# Patient Record
Sex: MALE | Race: WHITE | NOT HISPANIC OR LATINO | URBAN - METROPOLITAN AREA
[De-identification: names, ages, dates, MRNs, and addresses within clinical notes are randomized per-mention and may not be internally consistent; named-entity substitution may affect disease eponyms.]

---

## 2017-03-10 ENCOUNTER — FOLLOW UP ESTABLISHED (OUTPATIENT)
Dept: URBAN - METROPOLITAN AREA CLINIC 51 | Facility: CLINIC | Age: 72
End: 2017-03-10
Payer: MEDICARE

## 2017-03-10 DIAGNOSIS — H02.831 DERMATOCHALASIS OF RIGHT UPPER LID: ICD-10-CM

## 2017-03-10 DIAGNOSIS — H52.4 PRESBYOPIA: ICD-10-CM

## 2017-03-10 DIAGNOSIS — H17.11 CENTRAL CORNEAL OPACITY, RIGHT EYE: ICD-10-CM

## 2017-03-10 PROCEDURE — 92250 FUNDUS PHOTOGRAPHY W/I&R: CPT | Performed by: OPTOMETRIST

## 2017-03-10 PROCEDURE — 92014 COMPRE OPH EXAM EST PT 1/>: CPT | Performed by: OPTOMETRIST

## 2017-03-10 ASSESSMENT — INTRAOCULAR PRESSURE
OS: 16
OD: 16

## 2017-03-10 ASSESSMENT — VISUAL ACUITY
OS: 20/20
OD: 20/50

## 2018-03-20 ENCOUNTER — FOLLOW UP ESTABLISHED (OUTPATIENT)
Dept: URBAN - METROPOLITAN AREA CLINIC 51 | Facility: CLINIC | Age: 73
End: 2018-03-20
Payer: MEDICARE

## 2018-03-20 DIAGNOSIS — Z96.1 PRESENCE OF INTRAOCULAR LENS: ICD-10-CM

## 2018-03-20 DIAGNOSIS — E11.9 TYPE 2 DIABETES MELLITUS WITHOUT COMPLICATIONS: Primary | ICD-10-CM

## 2018-03-20 PROCEDURE — 92250 FUNDUS PHOTOGRAPHY W/I&R: CPT | Performed by: OPTOMETRIST

## 2018-03-20 PROCEDURE — 92014 COMPRE OPH EXAM EST PT 1/>: CPT | Performed by: OPTOMETRIST

## 2018-03-20 ASSESSMENT — INTRAOCULAR PRESSURE
OD: 16
OS: 16

## 2018-03-20 ASSESSMENT — VISUAL ACUITY
OS: 20/20
OD: 20/50

## 2018-03-20 ASSESSMENT — KERATOMETRY
OS: 41.95
OD: 40.84

## 2018-04-06 ENCOUNTER — FOLLOW UP ESTABLISHED (OUTPATIENT)
Dept: URBAN - METROPOLITAN AREA CLINIC 51 | Facility: CLINIC | Age: 73
End: 2018-04-06
Payer: MEDICARE

## 2018-04-06 DIAGNOSIS — D31.31 BENIGN NEOPLASM OF RIGHT CHOROID: Primary | ICD-10-CM

## 2018-04-06 PROCEDURE — 92134 CPTRZ OPH DX IMG PST SGM RTA: CPT | Performed by: OPHTHALMOLOGY

## 2018-04-06 PROCEDURE — 92004 COMPRE OPH EXAM NEW PT 1/>: CPT | Performed by: OPHTHALMOLOGY

## 2018-04-06 ASSESSMENT — INTRAOCULAR PRESSURE
OD: 17
OS: 18

## 2018-12-20 ENCOUNTER — FOLLOW UP ESTABLISHED (OUTPATIENT)
Dept: URBAN - METROPOLITAN AREA CLINIC 51 | Facility: CLINIC | Age: 73
End: 2018-12-20
Payer: MEDICARE

## 2018-12-20 DIAGNOSIS — H01.021 SQUAMOUS BLEPHARITIS OF RIGHT UPPER LID: ICD-10-CM

## 2018-12-20 DIAGNOSIS — H01.025 SQUAMOUS BLEPHARITIS OF LEFT LOWER LID: ICD-10-CM

## 2018-12-20 DIAGNOSIS — H01.024 SQUAMOUS BLEPHARITIS OF LEFT UPPER LID: Primary | ICD-10-CM

## 2018-12-20 PROCEDURE — 92012 INTRM OPH EXAM EST PATIENT: CPT | Performed by: OPTOMETRIST

## 2018-12-20 RX ORDER — DOXYCYCLINE HYCLATE 50 MG/1
50 MG TABLET, FILM COATED ORAL
Qty: 14 | Refills: 0 | Status: INACTIVE
Start: 2018-12-20 | End: 2018-12-27

## 2018-12-27 ENCOUNTER — FOLLOW UP ESTABLISHED (OUTPATIENT)
Dept: URBAN - METROPOLITAN AREA CLINIC 51 | Facility: CLINIC | Age: 73
End: 2018-12-27
Payer: MEDICARE

## 2018-12-27 PROCEDURE — 92012 INTRM OPH EXAM EST PATIENT: CPT | Performed by: OPTOMETRIST

## 2019-01-03 ENCOUNTER — FOLLOW UP ESTABLISHED (OUTPATIENT)
Dept: URBAN - METROPOLITAN AREA CLINIC 51 | Facility: CLINIC | Age: 74
End: 2019-01-03
Payer: MEDICARE

## 2019-01-03 PROCEDURE — 92012 INTRM OPH EXAM EST PATIENT: CPT | Performed by: OPTOMETRIST

## 2019-01-03 RX ORDER — NEOMYCIN SULFATE, POLYMYXIN B SULFATE AND DEXAMETHASONE 3.5; 10000; 1 MG/G; [USP'U]/G; MG/G
OINTMENT OPHTHALMIC
Qty: 1 | Refills: 2 | Status: INACTIVE
Start: 2019-01-03 | End: 2019-04-09

## 2019-01-17 ENCOUNTER — FOLLOW UP ESTABLISHED (OUTPATIENT)
Dept: URBAN - METROPOLITAN AREA CLINIC 51 | Facility: CLINIC | Age: 74
End: 2019-01-17
Payer: MEDICARE

## 2019-01-17 DIAGNOSIS — H01.022 SQUAMOUS BLEPHARITIS OF RIGHT LOWER LID: ICD-10-CM

## 2019-01-17 PROCEDURE — 92012 INTRM OPH EXAM EST PATIENT: CPT | Performed by: OPTOMETRIST

## 2019-01-29 ENCOUNTER — FOLLOW UP ESTABLISHED (OUTPATIENT)
Dept: URBAN - METROPOLITAN AREA CLINIC 51 | Facility: CLINIC | Age: 74
End: 2019-01-29
Payer: MEDICARE

## 2019-01-29 DIAGNOSIS — H00.15 CHALAZION LEFT LOWER LID: ICD-10-CM

## 2019-01-29 PROCEDURE — 92012 INTRM OPH EXAM EST PATIENT: CPT | Performed by: OPTOMETRIST

## 2019-01-29 PROCEDURE — 92285 EXTERNAL OCULAR PHOTOGRAPHY: CPT | Performed by: OPHTHALMOLOGY

## 2019-01-29 PROCEDURE — 99214 OFFICE O/P EST MOD 30 MIN: CPT | Performed by: OPHTHALMOLOGY

## 2019-01-29 RX ORDER — DOXYCYCLINE HYCLATE 100 MG/1
100 MG TABLET, COATED ORAL
Qty: 28 | Refills: 0 | Status: INACTIVE
Start: 2019-01-29 | End: 2019-04-09

## 2019-01-29 RX ORDER — BACITRACIN 500 [USP'U]/G
OINTMENT OPHTHALMIC
Qty: 1 | Refills: 1 | Status: INACTIVE
Start: 2019-01-29 | End: 2019-04-09

## 2019-02-05 ENCOUNTER — FOLLOW UP ESTABLISHED (OUTPATIENT)
Dept: URBAN - METROPOLITAN AREA CLINIC 44 | Facility: CLINIC | Age: 74
End: 2019-02-05
Payer: MEDICARE

## 2019-02-05 PROCEDURE — 92285 EXTERNAL OCULAR PHOTOGRAPHY: CPT | Performed by: OPHTHALMOLOGY

## 2019-02-26 ENCOUNTER — FOLLOW UP ESTABLISHED (OUTPATIENT)
Dept: URBAN - METROPOLITAN AREA CLINIC 51 | Facility: CLINIC | Age: 74
End: 2019-02-26
Payer: MEDICARE

## 2019-02-26 PROCEDURE — 92285 EXTERNAL OCULAR PHOTOGRAPHY: CPT | Performed by: OPHTHALMOLOGY

## 2019-02-26 PROCEDURE — 99213 OFFICE O/P EST LOW 20 MIN: CPT | Performed by: OPHTHALMOLOGY

## 2019-03-26 ENCOUNTER — FOLLOW UP ESTABLISHED (OUTPATIENT)
Dept: URBAN - METROPOLITAN AREA CLINIC 51 | Facility: CLINIC | Age: 74
End: 2019-03-26
Payer: MEDICARE

## 2019-03-26 PROCEDURE — 99213 OFFICE O/P EST LOW 20 MIN: CPT | Performed by: OPHTHALMOLOGY

## 2019-03-26 PROCEDURE — 92285 EXTERNAL OCULAR PHOTOGRAPHY: CPT | Performed by: OPHTHALMOLOGY

## 2019-04-09 ENCOUNTER — FOLLOW UP ESTABLISHED (OUTPATIENT)
Dept: URBAN - METROPOLITAN AREA CLINIC 51 | Facility: CLINIC | Age: 74
End: 2019-04-09
Payer: MEDICARE

## 2019-04-09 DIAGNOSIS — H43.813 VITREOUS DEGENERATION, BILATERAL: ICD-10-CM

## 2019-04-09 DIAGNOSIS — H04.123 TEAR FILM INSUFFICIENCY OF BILATERAL LACRIMAL GLANDS: ICD-10-CM

## 2019-04-09 PROCEDURE — 92014 COMPRE OPH EXAM EST PT 1/>: CPT | Performed by: OPTOMETRIST

## 2019-04-09 PROCEDURE — 92015 DETERMINE REFRACTIVE STATE: CPT | Performed by: OPTOMETRIST

## 2019-04-09 ASSESSMENT — VISUAL ACUITY
OD: 20/60
OS: 20/20

## 2019-04-09 ASSESSMENT — INTRAOCULAR PRESSURE
OS: 16
OD: 14

## 2019-05-20 ENCOUNTER — FOLLOW UP ESTABLISHED (OUTPATIENT)
Dept: URBAN - METROPOLITAN AREA CLINIC 51 | Facility: CLINIC | Age: 74
End: 2019-05-20
Payer: MEDICARE

## 2019-05-20 PROCEDURE — 92285 EXTERNAL OCULAR PHOTOGRAPHY: CPT | Performed by: OPHTHALMOLOGY

## 2019-05-20 PROCEDURE — 99213 OFFICE O/P EST LOW 20 MIN: CPT | Performed by: OPHTHALMOLOGY

## 2020-07-17 ENCOUNTER — FOLLOW UP ESTABLISHED (OUTPATIENT)
Dept: URBAN - METROPOLITAN AREA CLINIC 51 | Facility: CLINIC | Age: 75
End: 2020-07-17
Payer: MEDICARE

## 2020-07-17 DIAGNOSIS — H43.313 VITREOUS MEMBRANES AND STRANDS, BILATERAL: ICD-10-CM

## 2020-07-17 DIAGNOSIS — H02.834 DERMATOCHALASIS OF LEFT UPPER EYELID: ICD-10-CM

## 2020-07-17 DIAGNOSIS — Z79.84 LONG TERM (CURRENT) USE OF ORAL HYPOGLYCEMIC DRUGS: ICD-10-CM

## 2020-07-17 PROCEDURE — 92250 FUNDUS PHOTOGRAPHY W/I&R: CPT | Performed by: OPTOMETRIST

## 2020-07-17 PROCEDURE — 92014 COMPRE OPH EXAM EST PT 1/>: CPT | Performed by: OPTOMETRIST

## 2020-07-17 ASSESSMENT — KERATOMETRY
OD: 40.66
OS: 41.92

## 2020-07-17 ASSESSMENT — VISUAL ACUITY
OS: 20/20
OD: 20/60

## 2020-07-17 ASSESSMENT — INTRAOCULAR PRESSURE
OS: 16
OD: 16

## 2021-05-10 ENCOUNTER — OFFICE VISIT (OUTPATIENT)
Dept: URBAN - METROPOLITAN AREA CLINIC 51 | Facility: CLINIC | Age: 76
End: 2021-05-10
Payer: MEDICARE

## 2021-05-10 PROCEDURE — 92250 FUNDUS PHOTOGRAPHY W/I&R: CPT | Performed by: OPTOMETRIST

## 2021-05-10 PROCEDURE — 92014 COMPRE OPH EXAM EST PT 1/>: CPT | Performed by: OPTOMETRIST

## 2021-05-10 RX ORDER — METFORMIN HYDROCHLORIDE 850 MG/1
850 MG TABLET, FILM COATED ORAL
Qty: 0 | Refills: 0 | Status: ACTIVE
Start: 2021-05-10

## 2021-05-10 ASSESSMENT — INTRAOCULAR PRESSURE
OD: 16
OS: 17

## 2021-05-10 ASSESSMENT — VISUAL ACUITY
OD: 20/60
OS: 20/20

## 2021-05-10 NOTE — IMPRESSION/PLAN
Impression: Vitreous membranes and strands, bilateral Plan: No vitreous cells, retinal tears/holes, or detachments observed today. Patient to RTC immediately if notice sudden onset or worsening flashing lights, floaters, or a curtain over vision. Reviewed PPVit and pt may be interested.  Set up retina consult when he is ready

## 2021-05-10 NOTE — IMPRESSION/PLAN
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

## 2021-05-10 NOTE — IMPRESSION/PLAN
Impression: Presence of intraocular lens ; OU Plan: Well centered IOLs, status quo OU
standard IOL OD Restor IOL OS

## 2021-05-10 NOTE — IMPRESSION/PLAN
Impression: Presbyopia *Currently wears SV distance, SV near, and SV distance sunglasses Plan: Doing well, defers MRx

## 2022-06-07 ENCOUNTER — OFFICE VISIT (OUTPATIENT)
Dept: URBAN - METROPOLITAN AREA CLINIC 51 | Facility: CLINIC | Age: 77
End: 2022-06-07
Payer: MEDICARE

## 2022-06-07 DIAGNOSIS — H40.021 OPEN ANGLE WITH BORDERLINE FINDINGS, HIGH RISK, RIGHT EYE: ICD-10-CM

## 2022-06-07 DIAGNOSIS — H02.831 DERMATOCHALASIS OF RIGHT UPPER EYELID: ICD-10-CM

## 2022-06-07 DIAGNOSIS — H52.4 PRESBYOPIA: ICD-10-CM

## 2022-06-07 DIAGNOSIS — Z96.1 PRESENCE OF INTRAOCULAR LENS: ICD-10-CM

## 2022-06-07 DIAGNOSIS — Z79.84 LONG TERM (CURRENT) USE OF ORAL HYPOGLYCEMIC DRUGS: ICD-10-CM

## 2022-06-07 DIAGNOSIS — H04.123 TEAR FILM INSUFFICIENCY OF BILATERAL LACRIMAL GLANDS: ICD-10-CM

## 2022-06-07 DIAGNOSIS — H02.834 DERMATOCHALASIS OF LEFT UPPER EYELID: ICD-10-CM

## 2022-06-07 DIAGNOSIS — H17.11 CENTRAL CORNEAL OPACITY, RIGHT EYE: ICD-10-CM

## 2022-06-07 DIAGNOSIS — E11.9 TYPE 2 DIABETES MELLITUS W/O COMPLICATION: Primary | ICD-10-CM

## 2022-06-07 DIAGNOSIS — H43.313 VITREOUS MEMBRANES AND STRANDS, BILATERAL: ICD-10-CM

## 2022-06-07 PROCEDURE — 92250 FUNDUS PHOTOGRAPHY W/I&R: CPT | Performed by: OPTOMETRIST

## 2022-06-07 PROCEDURE — 99214 OFFICE O/P EST MOD 30 MIN: CPT | Performed by: OPTOMETRIST

## 2022-06-07 PROCEDURE — 92134 CPTRZ OPH DX IMG PST SGM RTA: CPT | Performed by: OPTOMETRIST

## 2022-06-07 ASSESSMENT — VISUAL ACUITY
OS: 20/20
OD: 20/70

## 2022-06-07 ASSESSMENT — KERATOMETRY
OS: 41.92
OD: 40.62

## 2022-06-07 ASSESSMENT — INTRAOCULAR PRESSURE
OD: 16
OS: 17

## 2022-06-07 NOTE — IMPRESSION/PLAN
Impression: Type 2 diabetes mellitus w/o complication: S66.9. Plan: No Background Diabetic Retinopathy, no Diabetic Macular Edema and no Neovascularization of the iris, disc, or elsewhere. Disc. ocular and systemic benefits of blood sugar control. The American Diabetes Association recommends target glycohemoglobin at 7.0% or less to minimize incidence of retinopathy as well as other systemic complications of diabetes mellitus. Send notes to PCP. Check annually.

## 2022-06-07 NOTE — IMPRESSION/PLAN
Impression: Presence of intraocular lens ; OU *standard IOL OD
*Restor IOL OS Plan: Well centered IOLs, status quo OU

## 2022-06-07 NOTE — IMPRESSION/PLAN
Impression: Central corneal opacity of right eye Plan: Hx of herpes OD as a child, no recurrences Treated with silver nitrate that left a scar, stable Monitor

## 2022-06-07 NOTE — IMPRESSION/PLAN
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
*Large flame heme at 7 o'clock OD 
* FOHx: Cousin dads side. *IOPs today 16mmHg OD and 17mmHg OS without medication. Plan: Based on large flame heme OD, may possibly be related to DM (reports fluctuating BS for the past 6 months). Pt discussion regarding diagnosis. Exp'd that it is a symptomless disease. Return to clinic for work up including HVF24-2, IOP recheck, Pachymetry and OCT RNFL next available. Will review test findings and treatment plan at follow up visit. No gtts given at this time.

## 2022-06-07 NOTE — IMPRESSION/PLAN
Impression: Long term (current) use of oral hypoglycemic drugs: Z79.84.
*Pt under care with PCP Plan: see diabetic plan

## 2022-07-21 ENCOUNTER — OFFICE VISIT (OUTPATIENT)
Dept: URBAN - METROPOLITAN AREA CLINIC 51 | Facility: CLINIC | Age: 77
End: 2022-07-21
Payer: MEDICARE

## 2022-07-21 DIAGNOSIS — H40.021 OPEN ANGLE WITH BORDERLINE FINDINGS, HIGH RISK, RIGHT EYE: Primary | ICD-10-CM

## 2022-07-21 PROCEDURE — 92083 EXTENDED VISUAL FIELD XM: CPT | Performed by: OPTOMETRIST

## 2022-07-21 PROCEDURE — 92133 CPTRZD OPH DX IMG PST SGM ON: CPT | Performed by: OPTOMETRIST

## 2022-07-21 PROCEDURE — 99213 OFFICE O/P EST LOW 20 MIN: CPT | Performed by: OPTOMETRIST

## 2022-07-21 PROCEDURE — 76514 ECHO EXAM OF EYE THICKNESS: CPT | Performed by: OPTOMETRIST

## 2022-07-21 ASSESSMENT — INTRAOCULAR PRESSURE
OS: 17
OD: 18

## 2022-07-21 NOTE — IMPRESSION/PLAN
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
*Large flame heme at 7 o'clock OD 
* FOHx: Cousin dads side. *IOPs today 18mmHg OD and 17mmHg OS without medication. *Pachymetry (07/18/2022) 579/589 *HVF 24-2 (07/21/2022) OD: unreliable; fixation loss OS: full *OCT RNFL (07/21/2022) OD: 92um ; no thinning OS: 89um ; no thinning  Plan: I counseled the patient regarding the following: Glaucoma suspects do not usually need to be treated but do need close follow up monitoring for early signs of glaucoma damage. Some glaucoma suspects require treatment, and the same medical options for glaucoma eye drops and pills are utilized. Laser procedures and intraocular surgery are usually reserved for patients with uncontrolled glaucoma. Expectations: Glaucoma is usually diagnosed when the following three criteria are present: elevated intraocular pressure, optic nerve damage, and visual field loss. When some of these criteria are missing, it can be challenging to make the diagnosis of glaucoma. Glaucoma suspects may also include individuals with other risk factors such as a family history of glaucoma, temporary elevations of IO when taking steroid medications, pseudoexfoliation syndrome, pigmentary dispersion syndrome, thin central corneal thickness, and optic disc hemorrhages. Check IOP, OCT RNFL and HVF annually at [facility] [place].

## 2023-07-13 ENCOUNTER — OFFICE VISIT (OUTPATIENT)
Dept: URBAN - METROPOLITAN AREA CLINIC 51 | Facility: CLINIC | Age: 78
End: 2023-07-13
Payer: MEDICARE

## 2023-07-13 DIAGNOSIS — H43.813 VITREOUS DEGENERATION, BILATERAL: ICD-10-CM

## 2023-07-13 DIAGNOSIS — Z96.1 PRESENCE OF INTRAOCULAR LENS: ICD-10-CM

## 2023-07-13 DIAGNOSIS — H17.11 CENTRAL CORNEAL OPACITY, RIGHT EYE: ICD-10-CM

## 2023-07-13 DIAGNOSIS — E11.9 TYPE 2 DIABETES MELLITUS W/O COMPLICATION: Primary | ICD-10-CM

## 2023-07-13 DIAGNOSIS — Z79.84 LONG TERM (CURRENT) USE OF ORAL HYPOGLYCEMIC DRUGS: ICD-10-CM

## 2023-07-13 DIAGNOSIS — H52.4 PRESBYOPIA: ICD-10-CM

## 2023-07-13 DIAGNOSIS — H02.831 DERMATOCHALASIS OF RIGHT UPPER EYELID: ICD-10-CM

## 2023-07-13 DIAGNOSIS — H02.834 DERMATOCHALASIS OF LEFT UPPER EYELID: ICD-10-CM

## 2023-07-13 DIAGNOSIS — H04.123 TEAR FILM INSUFFICIENCY OF BILATERAL LACRIMAL GLANDS: ICD-10-CM

## 2023-07-13 DIAGNOSIS — H40.021 OPEN ANGLE WITH BORDERLINE FINDINGS, HIGH RISK, RIGHT EYE: ICD-10-CM

## 2023-07-13 PROCEDURE — 92250 FUNDUS PHOTOGRAPHY W/I&R: CPT | Performed by: OPTOMETRIST

## 2023-07-13 PROCEDURE — 92133 CPTRZD OPH DX IMG PST SGM ON: CPT | Performed by: OPTOMETRIST

## 2023-07-13 PROCEDURE — 92083 EXTENDED VISUAL FIELD XM: CPT | Performed by: OPTOMETRIST

## 2023-07-13 PROCEDURE — 92014 COMPRE OPH EXAM EST PT 1/>: CPT | Performed by: OPTOMETRIST

## 2023-07-13 ASSESSMENT — KERATOMETRY
OS: 42.00
OD: 41.00

## 2023-07-13 ASSESSMENT — VISUAL ACUITY
OD: 20/50
OS: 20/20

## 2023-07-13 ASSESSMENT — INTRAOCULAR PRESSURE
OS: 17
OD: 16

## 2023-07-13 NOTE — IMPRESSION/PLAN
Impression: Type 2 diabetes mellitus w/o complication: Q32.6. Plan: No Background Diabetic Retinopathy, no Diabetic Macular Edema and no Neovascularization of the iris, disc, or elsewhere. Disc. ocular and systemic benefits of blood sugar control. The American Diabetes Association recommends target glycohemoglobin at 7.0% or less to minimize incidence of retinopathy as well as other systemic complications of diabetes mellitus. Send notes to PCP. Check annually.

## 2023-07-13 NOTE — IMPRESSION/PLAN
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
*Large flame heme at 7 o'clock OD-resolved * FOHx: Cousin dads side. *IOPs today 16mmHg OD and 17mmHg OS without medication. *Pachymetry (07/18/2022) 579/589 *HVF 24-2 (07/13/2023) OD: fixation loss; mild rim defect OS: full *HVF 24-2 (07/21/2022) OD: unreliable; fixation loss OS: full *OCT RNFL (07/13/2023) OD: 93um ; no thinning OS: 98um ; no thinning *OCT RNFL (07/21/2022) OD: 92um ; no thinning OS: 89um ; no thinning Plan: I counseled the patient regarding the following: Glaucoma suspects do not usually need to be treated but do need close follow up monitoring for early signs of glaucoma damage. Some glaucoma suspects require treatment, and the same medical options for glaucoma eye drops and pills are utilized. Laser procedures and intraocular surgery are usually reserved for patients with uncontrolled glaucoma. Expectations: Glaucoma is usually diagnosed when the following three criteria are present: elevated intraocular pressure, optic nerve damage, and visual field loss. When some of these criteria are missing, it can be challenging to make the diagnosis of glaucoma. Glaucoma suspects may also include individuals with other risk factors such as a family history of glaucoma, temporary elevations of IO when taking steroid medications, pseudoexfoliation syndrome, pigmentary dispersion syndrome, thin central corneal thickness, and optic disc hemorrhages.

## 2024-07-16 ENCOUNTER — OFFICE VISIT (OUTPATIENT)
Dept: URBAN - METROPOLITAN AREA CLINIC 51 | Facility: CLINIC | Age: 79
End: 2024-07-16
Payer: MEDICARE

## 2024-07-16 DIAGNOSIS — E11.9 TYPE 2 DIABETES MELLITUS WITHOUT COMPLICATIONS: Primary | ICD-10-CM

## 2024-07-16 DIAGNOSIS — Z96.1 PRESENCE OF INTRAOCULAR LENS: ICD-10-CM

## 2024-07-16 DIAGNOSIS — Z79.84 LONG TERM (CURRENT) USE OF ORAL HYPOGLYCEMIC DRUGS: ICD-10-CM

## 2024-07-16 DIAGNOSIS — H04.123 TEAR FILM INSUFFICIENCY OF BILATERAL LACRIMAL GLANDS: ICD-10-CM

## 2024-07-16 DIAGNOSIS — H17.11 CENTRAL CORNEAL OPACITY, RIGHT EYE: ICD-10-CM

## 2024-07-16 DIAGNOSIS — H52.4 PRESBYOPIA: ICD-10-CM

## 2024-07-16 DIAGNOSIS — H43.813 VITREOUS DEGENERATION, BILATERAL: ICD-10-CM

## 2024-07-16 DIAGNOSIS — H40.021 OPEN ANGLE WITH BORDERLINE FINDINGS, HIGH RISK, RIGHT EYE: ICD-10-CM

## 2024-07-16 PROCEDURE — 99213 OFFICE O/P EST LOW 20 MIN: CPT | Performed by: OPTOMETRIST

## 2024-07-16 PROCEDURE — 92134 CPTRZ OPH DX IMG PST SGM RTA: CPT | Performed by: OPTOMETRIST

## 2024-07-16 PROCEDURE — 92133 CPTRZD OPH DX IMG PST SGM ON: CPT | Performed by: OPTOMETRIST

## 2024-07-16 PROCEDURE — 92083 EXTENDED VISUAL FIELD XM: CPT | Performed by: OPTOMETRIST

## 2024-07-16 RX ORDER — PIOGLITAZONE 30 MG/1
30 MG TABLET ORAL
Qty: 0 | Refills: 0 | Status: ACTIVE
Start: 2024-07-16

## 2024-07-16 ASSESSMENT — INTRAOCULAR PRESSURE
OD: 16
OS: 16

## 2024-07-16 ASSESSMENT — VISUAL ACUITY
OD: 20/60
OS: 20/25

## 2024-07-16 ASSESSMENT — KERATOMETRY
OS: 42.13
OD: 40.88

## 2025-07-21 ENCOUNTER — OFFICE VISIT (OUTPATIENT)
Dept: URBAN - METROPOLITAN AREA CLINIC 51 | Facility: CLINIC | Age: 80
End: 2025-07-21
Payer: MEDICARE

## 2025-07-21 DIAGNOSIS — H43.813 VITREOUS DEGENERATION, BILATERAL: ICD-10-CM

## 2025-07-21 DIAGNOSIS — Z96.1 PRESENCE OF INTRAOCULAR LENS: ICD-10-CM

## 2025-07-21 DIAGNOSIS — H52.4 PRESBYOPIA: ICD-10-CM

## 2025-07-21 DIAGNOSIS — H04.123 TEAR FILM INSUFFICIENCY OF BILATERAL LACRIMAL GLANDS: ICD-10-CM

## 2025-07-21 DIAGNOSIS — H40.021 OPEN ANGLE WITH BORDERLINE FINDINGS, HIGH RISK, RIGHT EYE: ICD-10-CM

## 2025-07-21 DIAGNOSIS — E11.3393 TYPE 2 DIAB W MODERATE NONPRLF DIAB RTNOP W/O MACULAR EDEMA, BILATERAL: Primary | ICD-10-CM

## 2025-07-21 DIAGNOSIS — Z79.84 LONG TERM (CURRENT) USE OF ORAL HYPOGLYCEMIC DRUGS: ICD-10-CM

## 2025-07-21 PROCEDURE — 92133 CPTRZD OPH DX IMG PST SGM ON: CPT | Performed by: OPTOMETRIST

## 2025-07-21 PROCEDURE — 92134 CPTRZ OPH DX IMG PST SGM RTA: CPT | Performed by: OPTOMETRIST

## 2025-07-21 PROCEDURE — 99214 OFFICE O/P EST MOD 30 MIN: CPT | Performed by: OPTOMETRIST

## 2025-07-21 PROCEDURE — 92083 EXTENDED VISUAL FIELD XM: CPT | Performed by: OPTOMETRIST

## 2025-07-21 ASSESSMENT — INTRAOCULAR PRESSURE
OS: 16
OD: 16

## 2025-07-21 ASSESSMENT — VISUAL ACUITY
OS: 20/20
OD: 20/60

## 2025-07-21 ASSESSMENT — KERATOMETRY
OD: 40.88
OS: 41.88